# Patient Record
Sex: FEMALE | Race: WHITE | Employment: UNEMPLOYED | URBAN - NONMETROPOLITAN AREA
[De-identification: names, ages, dates, MRNs, and addresses within clinical notes are randomized per-mention and may not be internally consistent; named-entity substitution may affect disease eponyms.]

---

## 2017-01-26 ENCOUNTER — TELEPHONE (OUTPATIENT)
Dept: FAMILY MEDICINE CLINIC | Age: 37
End: 2017-01-26

## 2017-01-26 ENCOUNTER — OFFICE VISIT (OUTPATIENT)
Dept: FAMILY MEDICINE CLINIC | Age: 37
End: 2017-01-26

## 2017-01-26 VITALS
WEIGHT: 161.6 LBS | BODY MASS INDEX: 23.13 KG/M2 | SYSTOLIC BLOOD PRESSURE: 114 MMHG | HEART RATE: 88 BPM | RESPIRATION RATE: 14 BRPM | TEMPERATURE: 98.3 F | HEIGHT: 70 IN | DIASTOLIC BLOOD PRESSURE: 60 MMHG

## 2017-01-26 DIAGNOSIS — J01.90 ACUTE RHINOSINUSITIS: Primary | ICD-10-CM

## 2017-01-26 PROCEDURE — 99203 OFFICE O/P NEW LOW 30 MIN: CPT | Performed by: NURSE PRACTITIONER

## 2017-01-26 ASSESSMENT — PATIENT HEALTH QUESTIONNAIRE - PHQ9
SUM OF ALL RESPONSES TO PHQ9 QUESTIONS 1 & 2: 0
SUM OF ALL RESPONSES TO PHQ QUESTIONS 1-9: 0
1. LITTLE INTEREST OR PLEASURE IN DOING THINGS: 0
2. FEELING DOWN, DEPRESSED OR HOPELESS: 0

## 2017-04-27 ENCOUNTER — OFFICE VISIT (OUTPATIENT)
Dept: FAMILY MEDICINE CLINIC | Age: 37
End: 2017-04-27

## 2017-04-27 VITALS
BODY MASS INDEX: 22.66 KG/M2 | TEMPERATURE: 98 F | SYSTOLIC BLOOD PRESSURE: 128 MMHG | DIASTOLIC BLOOD PRESSURE: 80 MMHG | WEIGHT: 153 LBS | HEART RATE: 64 BPM | RESPIRATION RATE: 16 BRPM | HEIGHT: 69 IN

## 2017-04-27 DIAGNOSIS — B02.9 HERPES ZOSTER WITHOUT COMPLICATION: Primary | ICD-10-CM

## 2017-04-27 PROCEDURE — 99213 OFFICE O/P EST LOW 20 MIN: CPT | Performed by: NURSE PRACTITIONER

## 2017-04-27 RX ORDER — ACYCLOVIR 400 MG/1
800 TABLET ORAL
Qty: 70 TABLET | Refills: 0 | Status: SHIPPED | OUTPATIENT
Start: 2017-04-27 | End: 2017-05-04

## 2018-01-17 ENCOUNTER — TELEPHONE (OUTPATIENT)
Dept: FAMILY MEDICINE CLINIC | Age: 38
End: 2018-01-17

## 2018-01-17 DIAGNOSIS — Z20.828 EXPOSURE TO INFLUENZA: Primary | ICD-10-CM

## 2018-01-17 RX ORDER — OSELTAMIVIR PHOSPHATE 75 MG/1
75 CAPSULE ORAL DAILY
Qty: 10 CAPSULE | Refills: 0 | Status: SHIPPED | OUTPATIENT
Start: 2018-01-17 | End: 2018-01-27

## 2018-04-21 ENCOUNTER — HOSPITAL ENCOUNTER (EMERGENCY)
Age: 38
Discharge: HOME | End: 2018-04-21
Payer: COMMERCIAL

## 2018-04-21 VITALS
WEIGHT: 150 LBS | BODY MASS INDEX: 22.22 KG/M2 | HEART RATE: 102 BPM | TEMPERATURE: 98.3 F | RESPIRATION RATE: 16 BRPM | DIASTOLIC BLOOD PRESSURE: 76 MMHG | HEIGHT: 69 IN | SYSTOLIC BLOOD PRESSURE: 113 MMHG | OXYGEN SATURATION: 97 %

## 2018-04-21 DIAGNOSIS — J10.1 INFLUENZA B: Primary | ICD-10-CM

## 2018-04-21 LAB
FLU A ANTIGEN: NEGATIVE
INFLUENZA B AG, EIA: POSITIVE

## 2018-04-21 PROCEDURE — 99213 OFFICE O/P EST LOW 20 MIN: CPT

## 2018-04-21 PROCEDURE — 99213 OFFICE O/P EST LOW 20 MIN: CPT | Performed by: NURSE PRACTITIONER

## 2018-04-21 PROCEDURE — 87804 INFLUENZA ASSAY W/OPTIC: CPT

## 2018-04-21 RX ORDER — OSELTAMIVIR PHOSPHATE 75 MG/1
75 CAPSULE ORAL 2 TIMES DAILY
Qty: 10 CAPSULE | Refills: 0 | Status: SHIPPED | OUTPATIENT
Start: 2018-04-21 | End: 2018-04-26

## 2018-04-21 ASSESSMENT — ENCOUNTER SYMPTOMS
SINUS PRESSURE: 0
EYE PAIN: 0
EYE DISCHARGE: 0
SHORTNESS OF BREATH: 0
COLOR CHANGE: 0
DIARRHEA: 0
NAUSEA: 0
EYE ITCHING: 0
VOMITING: 0
TROUBLE SWALLOWING: 0
SINUS PAIN: 0
SORE THROAT: 1
COUGH: 1
RHINORRHEA: 0

## 2018-07-02 ENCOUNTER — TELEPHONE (OUTPATIENT)
Dept: FAMILY MEDICINE CLINIC | Age: 38
End: 2018-07-02

## 2018-07-02 NOTE — TELEPHONE ENCOUNTER
07/02/2018 Patient called office saying 2 weeks ago her child Pamela had hand foot and mouth disease, this patient got it last week, no blisters just spot. Wants to know if she can go back to work this Thursday. Patient ph. # [phone]. da

## 2018-11-06 ENCOUNTER — OFFICE VISIT (OUTPATIENT)
Dept: FAMILY MEDICINE CLINIC | Age: 38
End: 2018-11-06
Payer: COMMERCIAL

## 2018-11-06 VITALS
WEIGHT: 150 LBS | HEART RATE: 88 BPM | DIASTOLIC BLOOD PRESSURE: 68 MMHG | TEMPERATURE: 98.2 F | SYSTOLIC BLOOD PRESSURE: 116 MMHG | BODY MASS INDEX: 22.73 KG/M2 | RESPIRATION RATE: 12 BRPM | HEIGHT: 68 IN

## 2018-11-06 DIAGNOSIS — J01.90 ACUTE RHINOSINUSITIS: Primary | ICD-10-CM

## 2018-11-06 PROCEDURE — 99213 OFFICE O/P EST LOW 20 MIN: CPT | Performed by: NURSE PRACTITIONER

## 2018-11-06 RX ORDER — DOXYCYCLINE HYCLATE 100 MG
100 TABLET ORAL 2 TIMES DAILY
Qty: 14 TABLET | Refills: 0 | Status: SHIPPED | OUTPATIENT
Start: 2018-11-06 | End: 2018-11-13

## 2018-11-06 ASSESSMENT — PATIENT HEALTH QUESTIONNAIRE - PHQ9
SUM OF ALL RESPONSES TO PHQ QUESTIONS 1-9: 0
SUM OF ALL RESPONSES TO PHQ9 QUESTIONS 1 & 2: 0
SUM OF ALL RESPONSES TO PHQ QUESTIONS 1-9: 0
1. LITTLE INTEREST OR PLEASURE IN DOING THINGS: 0
2. FEELING DOWN, DEPRESSED OR HOPELESS: 0

## 2018-11-06 NOTE — PROGRESS NOTES
or edema  Skin exam - normal coloration and turgor, no rashes, no suspicious skin lesions noted. Psych -  Affect appropriate. Thought process is normal without evidence of depression or psychosis. Good insight and appropriae interaction. Cognition and memory appear to be intact. ASSESSMENT & PLAN  Adelina Kurtz was seen today for nasal congestion. Diagnoses and all orders for this visit:    Acute rhinosinusitis  -     doxycycline hyclate (VIBRA-TABS) 100 MG tablet; Take 1 tablet by mouth 2 times daily for 7 days      - start Doxy and con't with OTC treatments of choice  - work slip    Return if symptoms worsen or fail to improve.     -Patient advised to call immediately or go to ER if any worsening of symptoms  -Patient counseled on conservative care including fluids, rest and OTC meds    Adelina Kurtz received counseling on the following healthy behaviors: medication adherence  Reviewed prior labs and health maintenance. Continue current medications, diet and exercise. Discussed use, benefit, and side effects of prescribed medications. Barriers to medication compliance addressed. Patient given educational materials - see patient instructions. All patient questions answered. Patient voiced understanding. I have reviewed this patient's history, habits, and medication list and have updated the chart where appropriate.

## 2018-12-17 ENCOUNTER — NURSE ONLY (OUTPATIENT)
Dept: LAB | Age: 38
End: 2018-12-17

## 2018-12-17 ENCOUNTER — TELEPHONE (OUTPATIENT)
Dept: FAMILY MEDICINE CLINIC | Age: 38
End: 2018-12-17

## 2018-12-17 ENCOUNTER — OFFICE VISIT (OUTPATIENT)
Dept: FAMILY MEDICINE CLINIC | Age: 38
End: 2018-12-17
Payer: COMMERCIAL

## 2018-12-17 VITALS
RESPIRATION RATE: 14 BRPM | TEMPERATURE: 98.2 F | DIASTOLIC BLOOD PRESSURE: 68 MMHG | BODY MASS INDEX: 21.28 KG/M2 | HEIGHT: 71 IN | WEIGHT: 152 LBS | HEART RATE: 80 BPM | SYSTOLIC BLOOD PRESSURE: 118 MMHG

## 2018-12-17 DIAGNOSIS — Z13.1 SCREENING FOR DIABETES MELLITUS: Primary | ICD-10-CM

## 2018-12-17 DIAGNOSIS — Z13.1 SCREENING FOR DIABETES MELLITUS: ICD-10-CM

## 2018-12-17 DIAGNOSIS — H26.9 CATARACT OF RIGHT EYE, UNSPECIFIED CATARACT TYPE: ICD-10-CM

## 2018-12-17 LAB
AVERAGE GLUCOSE: 99 MG/DL (ref 70–126)
GLUCOSE BLD-MCNC: 94 MG/DL
HBA1C MFR BLD: 5.3 % (ref 4.4–6.4)

## 2018-12-17 PROCEDURE — 82962 GLUCOSE BLOOD TEST: CPT | Performed by: NURSE PRACTITIONER

## 2018-12-17 PROCEDURE — 99213 OFFICE O/P EST LOW 20 MIN: CPT | Performed by: NURSE PRACTITIONER

## 2018-12-17 NOTE — PROGRESS NOTES
Chief Complaint   Patient presents with    Other     Pt has a cataract on her right eye. She states her optho told her this is due to diabetes or steriod use adn was told to f/u with her PCP about this. History obtained from chart review and the patient. SUBJECTIVE:  Nancy Johnson is a 45 y.o. 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 She is not having any vision changes. No history of DM in the family, but does have family Hx of having problems with low blood sugars. She has eaten breakfast this morning. She did check her blood sugar fasting once at home and it was 89. She does tend to eat more often. Age/Gender Health Maintenance    Lipid - n/a  DM Screen - n/a  Colon Cancer Screening - n/a  Lung Cancer Screening (Age 54 to [de-identified] with 30 pack year hx, current smoker or quit within past 15 years) - n/a    Tetanus - needs  Influenza Vaccine - needs  Pneumonia Vaccine - n/a  Zostavax - n/a   HPV Vaccine - n/a    Breast Cancer Screening - n/a  Cervical Cancer Screening - per OB  Osteoporosis Screening - n/a  Chlamydia Screen - n/a    AAA Screening - n/a    Falls screening - n/a    Current Outpatient Prescriptions   Medication Sig Dispense Refill    Multiple Vitamins-Minerals (MULTIVITAMIN ADULT PO) Take by mouth      UNKNOWN TO PATIENT       Pseudoephedrine-APAP-DM (DAYQUIL PO) Take by mouth      Prenatal MV-Min-Fe Fum-FA-DHA (PRENATAL 1 PO) Take 1 tablet by mouth daily       No current facility-administered medications for this visit. No orders of the defined types were placed in this encounter. All medications reviewed and reconciled, including OTC and herbal medications. Updated list given to patient. Patient Active Problem List   Diagnosis    Normal        No past medical history on file.     No past surgical history on EXAM:  Vitals:    12/17/18 0930   BP: 118/68   Pulse: 80   Resp: 14   Temp: 98.2 °F (36.8 °C)   Weight: 152 lb (68.9 kg)   Height: 5' 11\" (1.803 m)     Body mass index is 21.2 kg/m². VS Reviewed  General Appearance: A&O x 3, No acute distress,well developed and well- nourished  Head: normocephalic and atraumatic  Eyes: pupils equal, round, and reactive to light, extraocular eye movements intact, conjunctivae and eye lids without erythema  Neck: supple and non-tender without mass, no thyromegaly or thyroid nodules, no cervical lymphadenopathy  Pulmonary/Chest: clear to auscultation bilaterally  Cardiovascular: S1 and S2 auscultated w/ RRR. No murmurs, rubs, clicks, or gallops, distal pulses intact. Abdomen: soft, non-tender, non-distended, bowl sounds physiologic,  no rebound or guarding, no masses or hernias noted. Liver and spleen without enlargement. Extremities: no cyanosis, clubbing or edema of the lower extremities  Musculoskeletal: No joint swelling or gross deformity   Neuro:  Alert, 5/5 strength globally and symmetrically  Psych: Affect appropriate. Mood normal. Thought process is normal without evidence of depression or psychosis. Good insight and appropriate interaction. Cognition and memory appear to be intact. Skin: warm and dry, no rash or erythema  Lymph:  No cervical, auricular or supraclavicular lymph nodes palpated      ASSESSMENT & PLAN  Adelina Kurtz was seen today for other. Diagnoses and all orders for this visit:    Screening for diabetes mellitus  -     POCT Glucose  -     Hemoglobin A1C; Future    Cataract of right eye, unspecified cataract type      - nonfasting glucose 94  - will check A1C and call with results  - f/u with ophthalmologist     DISPOSITION    Return if symptoms worsen or fail to improve. Adelina Kurtz released without restrictions.       PATIENT COUNSELING    Counseling was provided today regarding the following topics: Healthy eating habits, Regular exercise,

## 2019-03-01 ENCOUNTER — OFFICE VISIT (OUTPATIENT)
Dept: FAMILY MEDICINE CLINIC | Age: 39
End: 2019-03-01
Payer: COMMERCIAL

## 2019-03-01 VITALS
DIASTOLIC BLOOD PRESSURE: 66 MMHG | TEMPERATURE: 98.5 F | BODY MASS INDEX: 22.51 KG/M2 | HEIGHT: 69 IN | SYSTOLIC BLOOD PRESSURE: 100 MMHG | WEIGHT: 152 LBS | RESPIRATION RATE: 16 BRPM | HEART RATE: 84 BPM

## 2019-03-01 DIAGNOSIS — J01.90 ACUTE RHINOSINUSITIS: Primary | ICD-10-CM

## 2019-03-01 LAB
INFLUENZA VIRUS A RNA: NEGATIVE
INFLUENZA VIRUS B RNA: NEGATIVE

## 2019-03-01 PROCEDURE — 99213 OFFICE O/P EST LOW 20 MIN: CPT | Performed by: FAMILY MEDICINE

## 2019-03-01 PROCEDURE — 87502 INFLUENZA DNA AMP PROBE: CPT | Performed by: FAMILY MEDICINE

## 2019-03-01 RX ORDER — FLUTICASONE PROPIONATE 50 MCG
1 SPRAY, SUSPENSION (ML) NASAL DAILY
Qty: 1 BOTTLE | Refills: 0 | Status: SHIPPED | OUTPATIENT
Start: 2019-03-01 | End: 2019-08-27

## 2019-03-01 RX ORDER — DOXYCYCLINE HYCLATE 100 MG/1
100 CAPSULE ORAL 2 TIMES DAILY
Qty: 20 CAPSULE | Refills: 0 | Status: SHIPPED | OUTPATIENT
Start: 2019-03-01 | End: 2019-03-11

## 2019-03-01 RX ORDER — BENZONATATE 100 MG/1
CAPSULE ORAL
Qty: 60 CAPSULE | Refills: 0 | Status: SHIPPED | OUTPATIENT
Start: 2019-03-01 | End: 2019-03-11

## 2019-03-01 ASSESSMENT — PATIENT HEALTH QUESTIONNAIRE - PHQ9
SUM OF ALL RESPONSES TO PHQ9 QUESTIONS 1 & 2: 0
2. FEELING DOWN, DEPRESSED OR HOPELESS: 0
SUM OF ALL RESPONSES TO PHQ QUESTIONS 1-9: 0
SUM OF ALL RESPONSES TO PHQ QUESTIONS 1-9: 0
1. LITTLE INTEREST OR PLEASURE IN DOING THINGS: 0

## 2019-08-27 ENCOUNTER — HOSPITAL ENCOUNTER (EMERGENCY)
Age: 39
Discharge: HOME | End: 2019-08-27
Payer: COMMERCIAL

## 2019-08-27 VITALS
OXYGEN SATURATION: 99 % | TEMPERATURE: 97.6 F | DIASTOLIC BLOOD PRESSURE: 68 MMHG | SYSTOLIC BLOOD PRESSURE: 117 MMHG | BODY MASS INDEX: 22.96 KG/M2 | HEIGHT: 69 IN | WEIGHT: 155 LBS | HEART RATE: 82 BPM | RESPIRATION RATE: 18 BRPM

## 2019-08-27 DIAGNOSIS — H10.9 CONJUNCTIVITIS OF BOTH EYES, UNSPECIFIED CONJUNCTIVITIS TYPE: ICD-10-CM

## 2019-08-27 DIAGNOSIS — R09.81 SINUS CONGESTION: Primary | ICD-10-CM

## 2019-08-27 PROCEDURE — 99212 OFFICE O/P EST SF 10 MIN: CPT

## 2019-08-27 PROCEDURE — 99214 OFFICE O/P EST MOD 30 MIN: CPT | Performed by: NURSE PRACTITIONER

## 2019-08-27 RX ORDER — PREDNISONE 20 MG/1
40 TABLET ORAL DAILY
Qty: 14 TABLET | Refills: 0 | Status: SHIPPED | OUTPATIENT
Start: 2019-08-27 | End: 2019-09-03

## 2019-08-27 RX ORDER — GUAIFENESIN AND PSEUDOEPHEDRINE HCL 1200; 120 MG/1; MG/1
1 TABLET, EXTENDED RELEASE ORAL 2 TIMES DAILY PRN
Qty: 20 TABLET | Refills: 0 | Status: SHIPPED | OUTPATIENT
Start: 2019-08-27 | End: 2019-08-27 | Stop reason: SDUPTHER

## 2019-08-27 RX ORDER — NEOMYCIN SULFATE, POLYMYXIN B SULFATE AND DEXAMETHASONE 3.5; 10000; 1 MG/ML; [USP'U]/ML; MG/ML
1 SUSPENSION/ DROPS OPHTHALMIC EVERY 4 HOURS
COMMUNITY

## 2019-08-27 RX ORDER — DOXYCYCLINE HYCLATE 100 MG
100 TABLET ORAL 2 TIMES DAILY
Qty: 14 TABLET | Refills: 0 | Status: SHIPPED | OUTPATIENT
Start: 2019-08-27 | End: 2019-09-03

## 2019-08-27 RX ORDER — KETOTIFEN FUMARATE 0.35 MG/ML
1 SOLUTION/ DROPS OPHTHALMIC 2 TIMES DAILY
Qty: 1 ML | Refills: 0 | Status: SHIPPED | OUTPATIENT
Start: 2019-08-27 | End: 2019-09-06

## 2019-08-27 RX ORDER — KETOTIFEN FUMARATE 0.35 MG/ML
1 SOLUTION/ DROPS OPHTHALMIC 2 TIMES DAILY
Qty: 1 ML | Refills: 0 | Status: SHIPPED | OUTPATIENT
Start: 2019-08-27 | End: 2019-08-27 | Stop reason: SDUPTHER

## 2019-08-27 RX ORDER — GUAIFENESIN AND PSEUDOEPHEDRINE HCL 1200; 120 MG/1; MG/1
1 TABLET, EXTENDED RELEASE ORAL 2 TIMES DAILY PRN
Qty: 20 TABLET | Refills: 0 | Status: SHIPPED | OUTPATIENT
Start: 2019-08-27

## 2019-08-27 RX ORDER — PREDNISONE 20 MG/1
40 TABLET ORAL DAILY
Qty: 14 TABLET | Refills: 0 | Status: SHIPPED | OUTPATIENT
Start: 2019-08-27 | End: 2019-08-27 | Stop reason: SDUPTHER

## 2019-08-27 ASSESSMENT — PAIN SCALES - GENERAL: PAINLEVEL_OUTOF10: 7

## 2019-08-27 ASSESSMENT — ENCOUNTER SYMPTOMS
NAUSEA: 0
ABDOMINAL PAIN: 1
VOMITING: 0
DIARRHEA: 1
VOICE CHANGE: 1
SHORTNESS OF BREATH: 0
COUGH: 0
EYE REDNESS: 1
EYE DISCHARGE: 0
SORE THROAT: 1
EYE ITCHING: 1

## 2019-08-27 ASSESSMENT — PAIN DESCRIPTION - PAIN TYPE: TYPE: ACUTE PAIN

## 2019-08-27 ASSESSMENT — PAIN DESCRIPTION - PROGRESSION: CLINICAL_PROGRESSION: NOT CHANGED

## 2019-08-27 ASSESSMENT — PAIN DESCRIPTION - LOCATION: LOCATION: THROAT

## 2019-08-27 ASSESSMENT — PAIN - FUNCTIONAL ASSESSMENT: PAIN_FUNCTIONAL_ASSESSMENT: ACTIVITIES ARE NOT PREVENTED

## 2019-08-27 ASSESSMENT — PAIN DESCRIPTION - DESCRIPTORS: DESCRIPTORS: ACHING;SORE

## 2019-08-27 ASSESSMENT — PAIN DESCRIPTION - FREQUENCY: FREQUENCY: CONTINUOUS

## 2019-08-27 ASSESSMENT — PAIN DESCRIPTION - ONSET: ONSET: GRADUAL

## 2019-08-27 NOTE — ED PROVIDER NOTES
[facility]  Urgent Care Encounter       CHIEF COMPLAINT       Chief Complaint   Patient presents with    Pharyngitis    Diarrhea    Conjunctivitis       Nurses Notes reviewed and I agree except as noted in the HPI. HISTORY OF PRESENT ILLNESS   Christina Duncan is a 44 y.o. female who presents for evaluation of sore throat, laryngitis, diarrhea, and red/irritated eyes with postnasal drip. Patient states that she has had a sore throat and voice issues for the past 5 days. States that she is also had eye irritation and redness for the past 4 days. She was seen by her eye doctor and was placed on antibiotic eyedrops. She states that she has used those for the past 4 days and it seems that the symptoms have been worsening and not getting better. States that she is had watering but denies any green or yellow drainage. She states that the diarrhea has been intermittent over the past 2 to 3 days. She denies any nausea, vomiting, fever, chills or any other concerns. The history is provided by the patient. REVIEW OF SYSTEMS     Review of Systems   Constitutional: Negative for chills and fever. HENT: Positive for congestion, postnasal drip, sore throat and voice change. Eyes: Positive for redness and itching. Negative for discharge (watering) and visual disturbance. Respiratory: Negative for cough and shortness of breath. Cardiovascular: Negative for chest pain. Gastrointestinal: Positive for abdominal pain and diarrhea. Negative for nausea and vomiting. Musculoskeletal: Negative for arthralgias and myalgias. Skin: Negative for rash. Allergic/Immunologic: Negative for environmental allergies. Neurological: Negative for weakness and headaches. PAST MEDICAL HISTORY   History reviewed. No pertinent past medical history. SURGICALHISTORY     Patient  has no past surgical history on file.     CURRENT MEDICATIONS       Discharge Medication List as of

## 2019-08-27 NOTE — ED TRIAGE NOTES
Pt to [facility] ambulatory with sore throat, diarrhea, and bilateral eye redness. This started 8/23/19.

## 2020-02-12 RX ORDER — OSELTAMIVIR PHOSPHATE 75 MG/1
75 CAPSULE ORAL DAILY
Qty: 10 CAPSULE | Refills: 0 | Status: SHIPPED | OUTPATIENT
Start: 2020-02-12 | End: 2020-02-22

## 2021-10-18 ENCOUNTER — TELEPHONE (OUTPATIENT)
Dept: FAMILY MEDICINE CLINIC | Age: 41
End: 2021-10-18

## 2021-10-18 DIAGNOSIS — B86 SCABIES: Primary | ICD-10-CM

## 2021-10-18 RX ORDER — PERMETHRIN 50 MG/G
CREAM TOPICAL
Qty: 60 G | Refills: 1 | Status: SHIPPED | OUTPATIENT
Start: 2021-10-18

## 2021-10-18 NOTE — TELEPHONE ENCOUNTER
Mom states she noticed she has red itchy spots on her neck, she wanted to know if she needs treated for scabies also

## 2021-10-18 NOTE — TELEPHONE ENCOUNTER
Probably not a bad idea. Generally scabies goes below the neck. Neck and face are rarely involved particularly in adults. I did send the cream though for her to be safe.

## 2021-12-09 ENCOUNTER — HOSPITAL ENCOUNTER (OUTPATIENT)
Dept: WOMENS IMAGING | Age: 41
Discharge: HOME | End: 2021-12-09
Payer: COMMERCIAL

## 2021-12-09 DIAGNOSIS — Z12.31 VISIT FOR SCREENING MAMMOGRAM: ICD-10-CM

## 2021-12-09 PROCEDURE — 77063 BREAST TOMOSYNTHESIS BI: CPT

## 2021-12-27 ENCOUNTER — TELEPHONE (OUTPATIENT)
Dept: FAMILY MEDICINE CLINIC | Age: 41
End: 2021-12-27

## 2021-12-27 NOTE — TELEPHONE ENCOUNTER
Pt called stating she took an at home test this past Monday and it was positive. Her  is positive also, she states he is getting the infusion. She states she is feeling better but didn't know if she qualified for the infusion. Symptoms started on Monday the 20th.

## 2021-12-27 NOTE — TELEPHONE ENCOUNTER
Her  qualified for the infusion because of his asthma history. I don't think she is going to qualify. Plus, she's already been sick for 7 days. The goal is to get the antibody infusion as early as possible. If she's already feeling better, I wouldn't mess with it.

## 2022-01-18 ENCOUNTER — TELEPHONE (OUTPATIENT)
Dept: FAMILY MEDICINE CLINIC | Age: 42
End: 2022-01-18

## 2022-01-18 NOTE — TELEPHONE ENCOUNTER
Called pt and per pt request she wants to be seen for finger pain. Middle finger, left hand. Feels it might be glass or splinter. Pt requested 02/07/2022. Offered earlier and she refused at this time. States she will call if she decides she wants to be seen sooner.

## 2022-01-18 NOTE — TELEPHONE ENCOUNTER
----- Message from Zahraa Jolley sent at 1/18/2022 11:32 AM EST -----  Subject: Message to Provider    QUESTIONS  Information for Provider? pt has a possible splinter still in her finger   going on about 2 months now. pt said it is still hurting maybe there is   glass in her finger. she is unsure but she would like to be seen if   possible. please call pt to set up her appt.   ---------------------------------------------------------------------------  --------------  CALL BACK INFO  What is the best way for the office to contact you? OK to leave message on   voicemail  Preferred Call Back Phone Number? [phone]  ---------------------------------------------------------------------------  --------------  SCRIPT ANSWERS  Relationship to Patient? Self  (Is the patient requesting to see the provider for a procedure?)?  Yes

## 2022-01-18 NOTE — TELEPHONE ENCOUNTER
----- Message from Rafamonique Trish sent at 1/18/2022 11:32 AM EST -----  Subject: Message to Provider    QUESTIONS  Information for Provider? pt has a possible splinter still in her finger   going on about 2 months now. pt said it is still hurting maybe there is   glass in her finger. she is unsure but she would like to be seen if   possible. please call pt to set up her appt.   ---------------------------------------------------------------------------  --------------  CALL BACK INFO  What is the best way for the office to contact you? OK to leave message on   voicemail  Preferred Call Back Phone Number? [phone]  ---------------------------------------------------------------------------  --------------  SCRIPT ANSWERS  Relationship to Patient? Self  (Is the patient requesting to see the provider for a procedure?)?  Yes

## 2022-01-18 NOTE — TELEPHONE ENCOUNTER
Future Appointments   Date Time Provider Stacie Staples   2/7/2022 11:00 AM [address] [facility]

## 2022-02-07 ENCOUNTER — HOSPITAL ENCOUNTER (OUTPATIENT)
Dept: GENERAL RADIOLOGY | Age: 42
Discharge: HOME | End: 2022-02-07
Payer: COMMERCIAL

## 2022-02-07 ENCOUNTER — OFFICE VISIT (OUTPATIENT)
Dept: FAMILY MEDICINE CLINIC | Age: 42
End: 2022-02-07
Payer: COMMERCIAL

## 2022-02-07 ENCOUNTER — TELEPHONE (OUTPATIENT)
Dept: FAMILY MEDICINE CLINIC | Age: 42
End: 2022-02-07

## 2022-02-07 ENCOUNTER — HOSPITAL ENCOUNTER (OUTPATIENT)
Age: 42
Discharge: HOME | End: 2022-02-07
Payer: COMMERCIAL

## 2022-02-07 VITALS
RESPIRATION RATE: 12 BRPM | BODY MASS INDEX: 22.51 KG/M2 | TEMPERATURE: 97.9 F | SYSTOLIC BLOOD PRESSURE: 122 MMHG | HEART RATE: 81 BPM | DIASTOLIC BLOOD PRESSURE: 70 MMHG | OXYGEN SATURATION: 100 % | WEIGHT: 152 LBS | HEIGHT: 69 IN

## 2022-02-07 DIAGNOSIS — S60.452A FOREIGN BODY OF RIGHT MIDDLE FINGER: Primary | ICD-10-CM

## 2022-02-07 DIAGNOSIS — S60.452A FOREIGN BODY OF RIGHT MIDDLE FINGER: ICD-10-CM

## 2022-02-07 PROCEDURE — 73140 X-RAY EXAM OF FINGER(S): CPT

## 2022-02-07 PROCEDURE — 99213 OFFICE O/P EST LOW 20 MIN: CPT | Performed by: NURSE PRACTITIONER

## 2022-02-07 SDOH — ECONOMIC STABILITY: FOOD INSECURITY: WITHIN THE PAST 12 MONTHS, YOU WORRIED THAT YOUR FOOD WOULD RUN OUT BEFORE YOU GOT MONEY TO BUY MORE.: NEVER TRUE

## 2022-02-07 SDOH — ECONOMIC STABILITY: FOOD INSECURITY: WITHIN THE PAST 12 MONTHS, THE FOOD YOU BOUGHT JUST DIDN'T LAST AND YOU DIDN'T HAVE MONEY TO GET MORE.: NEVER TRUE

## 2022-02-07 ASSESSMENT — PATIENT HEALTH QUESTIONNAIRE - PHQ9
SUM OF ALL RESPONSES TO PHQ9 QUESTIONS 1 & 2: 0
SUM OF ALL RESPONSES TO PHQ QUESTIONS 1-9: 0
2. FEELING DOWN, DEPRESSED OR HOPELESS: 0
SUM OF ALL RESPONSES TO PHQ QUESTIONS 1-9: 0
1. LITTLE INTEREST OR PLEASURE IN DOING THINGS: 0
SUM OF ALL RESPONSES TO PHQ QUESTIONS 1-9: 0
SUM OF ALL RESPONSES TO PHQ QUESTIONS 1-9: 0

## 2022-02-07 ASSESSMENT — SOCIAL DETERMINANTS OF HEALTH (SDOH): HOW HARD IS IT FOR YOU TO PAY FOR THE VERY BASICS LIKE FOOD, HOUSING, MEDICAL CARE, AND HEATING?: NOT HARD AT ALL

## 2022-02-07 NOTE — TELEPHONE ENCOUNTER
----- Message from GWENDOLYN Cardenas - CNP sent at 2/7/2022  2:09 PM EST -----  Let Sheryl Duran know her xray shows a possible tiny \"chip\" fracture of the tip of her finger, vs a possible small foreign body in her finger. Radiologist isn't exactly sure which is is based upon the xrays. I'm going to refer to ortho to help determine which it is.

## 2022-02-07 NOTE — PROGRESS NOTES
Chief Complaint   Patient presents with    Other     possible glass in middle finger right hand - happened in Novemeber        History obtained from chart review and the patient. SUBJECTIVE:  Symone Zacarias is a 39 y.o. female that presents today for possible FB in finger    She cut her finger several months ago on a piece of glass. Felt like she had a splinter in it and she felt like she got it all out, but she is still having random pains in her finger. Age/Gender Health Maintenance    Lipid - n/a  DM Screen - n/a  Colon Cancer Screening - n/a  Lung Cancer Screening (Age 54 to [de-identified] with 27 pack year hx, current smoker or quit within past 15 years) - n/a    Tetanus - needs  Influenza Vaccine - needs  Pneumonia Vaccine - n/a  Zostavax - n/a   HPV Vaccine - n/a    Breast Cancer Screening - n/a  Cervical Cancer Screening - per OB  Osteoporosis Screening - n/a  Chlamydia Screen - n/a    AAA Screening - n/a    Falls screening - n/a    Current Outpatient Medications   Medication Sig Dispense Refill    Pseudoephedrine-guaiFENesin (MUCINEX D MAX STRENGTH) 120-1200 MG TB12 Take 1 tablet by mouth 2 times daily as needed (cough/congestion) 20 tablet 0    Multiple Vitamins-Minerals (MULTIVITAMIN ADULT PO) Take by mouth      permethrin (ELIMITE) 5 % cream Apply topically as directed (Patient not taking: Reported on 2022) 60 g 1    neomycin-polymyxin-dexameth (MAXITROL) 3.5-02039-1.1 ophthalmic suspension Place 1 drop into both eyes every 4 hours (Patient not taking: Reported on 2022)      UNKNOWN TO PATIENT  (Patient not taking: Reported on 2022)       No current facility-administered medications for this visit. No orders of the defined types were placed in this encounter. All medications reviewed and reconciled, including OTC and herbal medications. Updated list given to patient. Patient Active Problem List   Diagnosis    Normal        History reviewed.  No pertinent past medical history. History reviewed. No pertinent surgical history. Allergies   Allergen Reactions    Pcn [Penicillins] Other (See Comments)     Family history        Social History     Socioeconomic History    Marital status:      Spouse name: Not on file    Number of children: Not on file    Years of education: Not on file    Highest education level: Not on file   Occupational History    Not on file   Tobacco Use    Smoking status: Never Smoker    Smokeless tobacco: Never Used   Vaping Use    Vaping Use: Never used   Substance and Sexual Activity    Alcohol use: No    Drug use: No    Sexual activity: Yes     Partners: Male   Other Topics Concern    Not on file   Social History Narrative    Not on file     Social Determinants of Health     Financial Resource Strain: Low Risk     Difficulty of Paying Living Expenses: Not hard at all   Food Insecurity: No Food Insecurity    Worried About [address] in the Last Year: Never true    [address] [employer] in the Last Year: Never true   Transportation Needs:     Lack of Transportation (Medical): Not on file    Lack of Transportation (Non-Medical):  Not on file   Physical Activity:     Days of Exercise per Week: Not on file    Minutes of Exercise per Session: Not on file   Stress:     Feeling of Stress : Not on file   Social Connections:     Frequency of Communication with Friends and Family: Not on file    Frequency of Social Gatherings with Friends and Family: Not on file    Attends Shinto Services: Not on file    Active Member of Clubs or Organizations: Not on file    Attends Club or Organization Meetings: Not on file    Marital Status: Not on file   Intimate Partner Violence:     Fear of Current or Ex-Partner: Not on file    Emotionally Abused: Not on file    Physically Abused: Not on file    Sexually Abused: Not on file   Housing Stability:     Unable to Pay for Housing in the Last Year: Not on file    Number of Places Lived in the Last Year: Not on file    Unstable Housing in the Last Year: Not on file       Family History   Problem Relation Age of Onset    Breast Cancer Mother 61         I have reviewed the patient's past medical history, past surgical history, allergies, medications, social and family history and I have made updates where appropriate. Review of Systems  Positive responses are highlighted in bold    Constitutional:  Fever, Chills, Night Sweats, Fatigue, Unexpected changes in weight  Eyes:  Eye discharge, Eye pain, Eye redness, Visual disturbances   HENT:  Ear pain, Tinnitus, Nosebleeds, Trouble swallowing, Hearing loss, Sore throat  Cardiovascular:  Chest Pain, Palpitations, Orthopnea, Paroxysmal Nocturnal Dyspnea  Respiratory:  Cough, Wheezing, Shortness of breath, Chest tightness, Apnea  Gastrointestinal:  Nausea, Vomiting, Diarrhea, Constipation, Heartburn, Blood in stool  Genitourinary:  Difficulty or painful urination, Flank pain, Change in frequency, Urgency  Skin:  Color change, Rash, Itching, Wound  Psychiatric:  Hallucinations, Anxiety, Depression, Suicidal ideation  Hematological:  Enlarged glands, Easy bleeding, Easily bruising  Musculoskeletal:  Joint pain, Back pain, Gait problems, Joint swelling, Myalgias  Neurological:  Dizziness, Headaches, Presyncope, Numbness, Seizures, Tremors  Allergy:  Environmental allergies, Food allergies  Endocrine:  Heat Intolerance, Cold Intolerance, Polydipsia, Polyphagia, Polyuria      PHYSICAL EXAM:  Vitals:    02/07/22 1102   BP: 122/70   Pulse: 81   Resp: 12   Temp: 97.9 °F (36.6 °C)   TempSrc: Oral   SpO2: 100%   Weight: 152 lb (68.9 kg)   Height: 5' 8.5\" (1.74 m)     Body mass index is 22.78 kg/m².          VS Reviewed  General Appearance: A&O x 3, No acute distress,well developed and well- nourished  Head: normocephalic and atraumatic  Eyes: pupils equal, round, and reactive to light, extraocular eye movements intact, conjunctivae and eye lids without erythema  Neck: supple and non-tender without mass, no thyromegaly or thyroid nodules, no cervical lymphadenopathy  Pulmonary/Chest: clear to auscultation bilaterally  Cardiovascular: S1 and S2 auscultated w/ RRR. No murmurs, rubs, clicks, or gallops, distal pulses intact. Abdomen: soft, non-tender, non-distended, bowl sounds physiologic,  no rebound or guarding, no masses or hernias noted. Liver and spleen without enlargement. Extremities: no cyanosis, clubbing or edema of the lower extremities  Musculoskeletal: No joint swelling or gross deformity   Neuro:  Alert, 5/5 strength globally and symmetrically  Psych: Affect appropriate. Mood normal. Thought process is normal without evidence of depression or psychosis. Good insight and appropriate interaction. Cognition and memory appear to be intact. Skin: warm and dry, no rash or erythema, right finger pad with scar ? FB  Lymph:  No cervical, auricular or supraclavicular lymph nodes palpated      ASSESSMENT & PLAN  Bella Shows was seen today for other. Diagnoses and all orders for this visit:    Foreign body of right middle finger  -     XR FINGER RIGHT (MIN 2 VIEWS); Future      - obtain xray to confirm if possible FB  - may benefit from seeing ortho    DISPOSITION    Return if symptoms worsen or fail to improve. Bella Shows released without restrictions. PATIENT COUNSELING    Counseling was provided today regarding the following topics: Healthy eating habits, Regular exercise, substance abuse and healthy sleep habits. Bella Shows received counseling on the following healthy behaviors: medication adherence    Patient given educational materials on: See Attached    I have instructed Bella Shows to complete a self tracking handout on none and instructed them to bring it with them to her next appointment. Barriers to learning and self management: none    Discussed use, benefit, and side effects of prescribed medications.   Barriers to medication compliance addressed. All patient questions answered. Pt voiced understanding.        Electronically signed by GWENDOLYN Thomas CNP on 2/7/2022 at 11:20 AM

## 2022-12-19 ENCOUNTER — TELEMEDICINE (OUTPATIENT)
Dept: FAMILY MEDICINE CLINIC | Age: 42
End: 2022-12-19
Payer: COMMERCIAL

## 2022-12-19 ENCOUNTER — TELEPHONE (OUTPATIENT)
Dept: FAMILY MEDICINE CLINIC | Age: 42
End: 2022-12-19

## 2022-12-19 DIAGNOSIS — J01.90 ACUTE RHINOSINUSITIS: Primary | ICD-10-CM

## 2022-12-19 PROCEDURE — 99213 OFFICE O/P EST LOW 20 MIN: CPT | Performed by: NURSE PRACTITIONER

## 2022-12-19 RX ORDER — DOXYCYCLINE HYCLATE 100 MG
100 TABLET ORAL 2 TIMES DAILY
Qty: 20 TABLET | Refills: 0 | Status: SHIPPED | OUTPATIENT
Start: 2022-12-19 | End: 2022-12-29

## 2022-12-19 ASSESSMENT — ENCOUNTER SYMPTOMS
DIARRHEA: 0
EYE REDNESS: 0
WHEEZING: 0
SINUS PRESSURE: 1
ABDOMINAL PAIN: 0
RHINORRHEA: 1
SORE THROAT: 1
SINUS PAIN: 1
EYE PAIN: 0
NAUSEA: 0
SHORTNESS OF BREATH: 0
COUGH: 1
TROUBLE SWALLOWING: 0
COLOR CHANGE: 0
VOMITING: 0

## 2022-12-19 ASSESSMENT — PATIENT HEALTH QUESTIONNAIRE - PHQ9
1. LITTLE INTEREST OR PLEASURE IN DOING THINGS: 0
SUM OF ALL RESPONSES TO PHQ9 QUESTIONS 1 & 2: 0
SUM OF ALL RESPONSES TO PHQ QUESTIONS 1-9: 0
SUM OF ALL RESPONSES TO PHQ QUESTIONS 1-9: 0
2. FEELING DOWN, DEPRESSED OR HOPELESS: 0
SUM OF ALL RESPONSES TO PHQ QUESTIONS 1-9: 0
SUM OF ALL RESPONSES TO PHQ QUESTIONS 1-9: 0

## 2022-12-19 NOTE — TELEPHONE ENCOUNTER
Future Appointments   Date Time Provider Stacie Staples   12/19/2022  2:40 PM GWENDOLYN Tripathi - CNP [facility]     Patient has been informed and voiced understanding

## 2022-12-19 NOTE — PROGRESS NOTES
2022    TELEHEALTH EVALUATION -- Audio/Visual (During WBMAW-85 public health emergency)    HPI:    Dedrick Wharton (:  1980) has requested an audio/video evaluation for the following concern(s):    URI Symptoms    HPI:      Symptoms have been present for 3 week(s). Symptoms are unchanged since they initially started. Fever? No  Runny nose or congestion? Yes   Cough? Yes  Sore throat? No  Headache, fatigue, joint pains, muscle aches? Yes - some sinus pressure and pain  Shortness of breath/Wheezing? No  Nausea/Vomiting/Diarrhea? No  Double Sickening? No  Sick contacts? Yes    Patient has tried OTC meds without improvement. Review of Systems   Constitutional:  Negative for chills, diaphoresis and fatigue. HENT:  Positive for congestion, postnasal drip, rhinorrhea, sinus pressure, sinus pain and sore throat. Negative for trouble swallowing. Eyes:  Negative for pain, redness and visual disturbance. Respiratory:  Positive for cough. Negative for shortness of breath and wheezing. Cardiovascular:  Negative for chest pain, palpitations and leg swelling. Gastrointestinal:  Negative for abdominal pain, diarrhea, nausea and vomiting. Endocrine: Negative for polydipsia, polyphagia and polyuria. Genitourinary:  Negative for decreased urine volume, dysuria, frequency and urgency. Musculoskeletal:  Negative for arthralgias and myalgias. Skin:  Negative for color change and rash. Allergic/Immunologic: Negative for environmental allergies, food allergies and immunocompromised state. Neurological:  Negative for dizziness, tremors, syncope and headaches. Hematological:  Negative for adenopathy. Psychiatric/Behavioral:  Negative for behavioral problems, confusion, self-injury and suicidal ideas. All other systems reviewed and are negative. Prior to Visit Medications    Medication Sig Taking?  Authorizing Provider   doxycycline hyclate (VIBRA-TABS) 100 MG tablet Take 1 Eyes:  EOM    [x]  Normal  [] Abnormal-  Sclera  [x]  Normal  [] Abnormal -         Discharge [x]  None visible  [] Abnormal -    HENT:   [x] Normocephalic, atraumatic. [] Abnormal   [x] Mouth/Throat: Mucous membranes are moist.     External Ears [x] Normal  [] Abnormal-     Neck: [x] No visualized mass     Pulmonary/Chest: [x] Respiratory effort normal.  [x] No visualized signs of difficulty breathing or respiratory distress        [] Abnormal-      Musculoskeletal:   [x] Normal gait with no signs of ataxia         [x] Normal range of motion of neck        [] Abnormal-       Neurological:        [x] No Facial Asymmetry (Cranial nerve 7 motor function) (limited exam to video visit)          [x] No gaze palsy        [] Abnormal-         Skin:        [x] No significant exanthematous lesions or discoloration noted on facial skin         [] Abnormal-            Psychiatric:       [x] Normal Affect [x] No Hallucinations        [] Abnormal-     Other pertinent observable physical exam findings-     ASSESSMENT & PLAN  Diagnoses and all orders for this visit:    Acute rhinosinusitis  -     doxycycline hyclate (VIBRA-TABS) 100 MG tablet; Take 1 tablet by mouth 2 times daily for 10 days    - defer testing due to duration of illness  - start Doxy     Return if symptoms worsen or fail to improve. James Pretty is a 43 y.o. female being evaluated by a Virtual Visit (video visit) encounter to address concerns as mentioned above. A caregiver was present when appropriate. Due to this being a TeleHealth encounter (During OIY-76 public health emergency), evaluation of the following organ systems was limited: Vitals/Constitutional/EENT/Resp/CV/GI//MS/Neuro/Skin/Heme-Lymph-Imm.   Pursuant to the emergency declaration under the [address] [address] waiver authority and the [employer] Act, this Virtual Visit was conducted with patient's (and/or legal guardian's) consent, to reduce the patient's risk of exposure to COVID-19 and provide necessary medical care. The patient (and/or legal guardian) has also been advised to contact this office for worsening conditions or problems, and seek emergency medical treatment and/or call 911 if deemed necessary. Services were provided through a video synchronous discussion virtually to substitute for in-person clinic visit. Patient and provider were located at their individual homes. --GWENDOLYN Campuzano CNP on 12/19/2022 at 12:09 PM    An electronic signature was used to authenticate this note.

## 2022-12-19 NOTE — TELEPHONE ENCOUNTER
Patient reports sxs x 3 weeks and worsened over the weekend sinus drainage, productive cough, fatigue  Requesting VV for herself and 2 children